# Patient Record
Sex: FEMALE | Race: BLACK OR AFRICAN AMERICAN | NOT HISPANIC OR LATINO | Employment: OTHER | URBAN - METROPOLITAN AREA
[De-identification: names, ages, dates, MRNs, and addresses within clinical notes are randomized per-mention and may not be internally consistent; named-entity substitution may affect disease eponyms.]

---

## 2023-05-05 LAB — URINE CULTURE: NO GROWTH

## 2023-05-20 LAB — URINE CULTURE: NORMAL

## 2023-06-20 LAB
BACTERIA, URINE: ABNORMAL /HPF
CLUE CELLS: ABNORMAL
MUCUS, URINE: ABNORMAL /LPF
NUGENT SCORE: 4
RBC, URINE: 11 /HPF (ref 0–5)
SQUAMOUS EPITHELIAL CELLS, URINE: 3 /HPF
VAGINITIS-BV + YEAST INTERPRETATION: ABNORMAL
WBC, URINE: 112 /HPF (ref 0–5)
YEAST: ABNORMAL

## 2023-06-21 LAB — URINE CULTURE: NORMAL

## 2023-06-24 LAB — FUNGAL SCREEN, YEAST: NORMAL

## 2023-10-05 DIAGNOSIS — R39.9 URINARY SYMPTOM OR SIGN: ICD-10-CM

## 2023-10-06 ENCOUNTER — LAB (OUTPATIENT)
Dept: LAB | Facility: LAB | Age: 73
End: 2023-10-06
Payer: COMMERCIAL

## 2023-10-06 DIAGNOSIS — R39.9 URINARY SYMPTOM OR SIGN: ICD-10-CM

## 2023-10-06 PROCEDURE — 87086 URINE CULTURE/COLONY COUNT: CPT

## 2023-10-08 LAB — BACTERIA UR CULT: NO GROWTH

## 2023-10-16 DIAGNOSIS — N94.9 VAGINAL SYMPTOM: ICD-10-CM

## 2023-10-16 RX ORDER — FLUCONAZOLE 150 MG/1
150 TABLET ORAL ONCE
Qty: 1 TABLET | Refills: 0 | Status: SHIPPED | OUTPATIENT
Start: 2023-10-16 | End: 2023-10-16

## 2023-10-16 RX ORDER — FLUTICASONE PROPIONATE 50 MCG
SPRAY, SUSPENSION (ML) NASAL
COMMUNITY
Start: 2022-04-08

## 2023-10-16 RX ORDER — ALBUTEROL SULFATE 90 UG/1
AEROSOL, METERED RESPIRATORY (INHALATION)
COMMUNITY
Start: 2013-11-12

## 2023-10-16 RX ORDER — FLAVOXATE HYDROCHLORIDE 100 MG/1
100 TABLET ORAL
COMMUNITY
Start: 2022-10-28

## 2023-10-16 RX ORDER — SOLIFENACIN SUCCINATE 5 MG/1
5 TABLET, FILM COATED ORAL
COMMUNITY
Start: 2023-07-14 | End: 2024-01-12 | Stop reason: DRUGHIGH

## 2023-10-16 RX ORDER — HYDROXYZINE HYDROCHLORIDE 25 MG/1
25 TABLET, FILM COATED ORAL
COMMUNITY
Start: 2022-10-28

## 2023-10-16 RX ORDER — TOPIRAMATE 50 MG/1
4 TABLET, FILM COATED ORAL NIGHTLY PRN
COMMUNITY
Start: 2023-09-22

## 2023-10-16 RX ORDER — LISINOPRIL 2.5 MG/1
2.5 TABLET ORAL
COMMUNITY
Start: 2023-07-21 | End: 2024-07-20

## 2023-10-16 RX ORDER — AMITRIPTYLINE HYDROCHLORIDE 25 MG/1
25 TABLET, FILM COATED ORAL NIGHTLY
COMMUNITY
Start: 2022-10-28

## 2023-10-18 PROBLEM — Z86.010 HX OF COLONIC POLYPS: Status: ACTIVE | Noted: 2017-11-01

## 2023-10-18 PROBLEM — E66.3 OVERWEIGHT WITH BODY MASS INDEX (BMI) 25.0-29.9: Status: ACTIVE | Noted: 2022-05-27

## 2023-10-18 PROBLEM — R30.0 DYSURIA: Status: ACTIVE | Noted: 2021-12-23

## 2023-10-18 PROBLEM — K21.9 GASTROESOPHAGEAL REFLUX DISEASE WITHOUT ESOPHAGITIS: Status: ACTIVE | Noted: 2021-06-17

## 2023-10-18 PROBLEM — Z86.0100 HX OF COLONIC POLYPS: Status: ACTIVE | Noted: 2017-11-01

## 2023-10-18 PROBLEM — I10 ESSENTIAL HYPERTENSION: Status: ACTIVE | Noted: 2022-05-27

## 2023-10-18 PROBLEM — N30.10 INTERSTITIAL CYSTITIS: Status: ACTIVE | Noted: 2022-11-22

## 2023-10-18 PROBLEM — R19.8 RECTAL PRESSURE: Status: ACTIVE | Noted: 2022-06-06

## 2023-10-18 RX ORDER — POLYETHYLENE GLYCOL 3350 17 G/17G
POWDER, FOR SOLUTION ORAL
COMMUNITY

## 2023-10-18 RX ORDER — DIAZEPAM 2 MG/1
2 TABLET ORAL
COMMUNITY

## 2023-10-18 RX ORDER — POLYETHYLENE GLYCOL 3350, SODIUM CHLORIDE, SODIUM BICARBONATE, POTASSIUM CHLORIDE 420; 11.2; 5.72; 1.48 G/4L; G/4L; G/4L; G/4L
POWDER, FOR SOLUTION ORAL
COMMUNITY

## 2023-10-18 RX ORDER — OMEPRAZOLE 40 MG/1
CAPSULE, DELAYED RELEASE ORAL
COMMUNITY
Start: 2022-06-28

## 2023-10-19 ENCOUNTER — TREATMENT (OUTPATIENT)
Dept: PHYSICAL THERAPY | Facility: CLINIC | Age: 73
End: 2023-10-19
Payer: COMMERCIAL

## 2023-10-19 DIAGNOSIS — R10.2 PELVIC PAIN IN FEMALE: Primary | ICD-10-CM

## 2023-10-19 DIAGNOSIS — R35.0 URINARY FREQUENCY: ICD-10-CM

## 2023-10-19 PROCEDURE — 97110 THERAPEUTIC EXERCISES: CPT | Mod: GP

## 2023-10-19 PROCEDURE — 97535 SELF CARE MNGMENT TRAINING: CPT | Mod: GP

## 2023-10-19 NOTE — LETTER
October 20, 2023    Alejandrina Abdalla, DRAGAN  [address] [facility] [address]    Patient: Pavan Cuevas   YOB: 1950   Date of Visit: 10/19/2023       Dear No referring provider defined for this encounter.    The attached plan of care is being sent to you because your patient’s medical reimbursement requires that you certify the plan of care. Your signature is required to allow uninterrupted insurance coverage.      You may indicate your approval by signing below and faxing this form back to us at Dept Fax: [phone].    Please call Dept: [phone] with any questions or concerns.    Thank you for this referral,        Alejandrina Abdalla PT  [facility] [address]  [address] [facility][1] [id number]    Payer: Payor: ABBE / Plan: [facility] [facility] / Product Type: *No Product type* /                                                                         Date:     Dear Alejandrina Abdalla PT,     Re: Ms. Pavan Cuevas, MRN:[id number]    I certify that I have reviewed the attached plan of care and it is medically necessary for Ms. Pavan Cuevas (1950) who is under my care.          ______________________________________                    _________________  Provider name and credentials                                           Date and time                                                                                           Plan of Care 10/20/23   Effective from: 10/20/2023  Effective to: 1/11/2024    Plan ID: [id number]            Participants as of Finalize on 10/20/2023    Name Type Comments Contact Info    Alejandrina Abdalla PT Physical Therapist  [phone]    Eufemia Gold MD MPH Consulting Physician  [phone]       Last Plan Note     Author: Alejandrina Abdalla PT Status: Sign when Signing Visit Last edited: 10/19/2023  3:15 PM       Physical Therapy    PELVIC FLOOR Treatment & Re-evaluation    Name: Pavan Cuevas  MRN:  [id number]  : 1950  Today's Date: 10/19/23     Time Calculation  Start Time: 320  Stop Time: 420  Time Calculation (min): 60 min  Visit 8    Assessment:     Patient is a 72 yo with past medical history inclusive of interstitial cystitis who was referred to physical therapy for pelvic pain, urinary frequency. She completed 8 visits since initial evaluation and continues to make progress with PT POC. She is without pain today or over the past week. Despite improvements in pain, increased urinary frequency is still present, although improved during the day since initial evaluation (night voids consistent on average with initial evaluation report). Patient is aware that stress is a contributing factor to increased urinary frequency and is incorporating stress reduction techniques, bladder retraining, behavioral modifications, and lifestyle changes to assist with management of symptoms. Encouraged increased consistency with HEP to further modulate and improve symptoms. Patient would benefit from continued PT interventions to progress towards all unmet goals, bladder retraining, behavioral modifications, and lifestyle changes to improve the patients quality of life.    Plan:    Planned interventions include: biofeedback, education/instruction, home program, hot pack, manual therapy, self care/home management, therapeutic activities and therapeutic exercises.   Frequency and duration: 1 time(s) a week, for 4-6 additional visits.   Potential to achieve rehab goals is good    Plan of care was developed with input and agreement by the patient.     Current Problem:  1. Pelvic pain in female        2. Urinary frequency            Subjective   HEP every 4-5 days.  No pain today or over the past week. No longer with general ache in the lower abdomen.  Notices frequency trigger with stress (I.e. with taking care of grandbaby), but is aware, utilizes techniques and able to hold.   Just weaned self for two weeks from  Solifenacin. Only went four times last night, otherwise 10-11 times over the past week (between 2-4am). Not having the urgency like how it used to be.  Walking 4 days/week, 4 miles. No pain and hasn't had to race to the restroom.  Occasional difficulty during the night starting the flow of urine, discomfort initially, but goes way. Does notice straining/pushing.     Objective     BLADDER:   Excessive Urinary Urgency: reduced; reports increased frequency  Nighttime Voiding Frequency: 10-11 voids/night  Unintentional urine loss frequency: none  Difficulty initiating flow of urine: occasionally  Difficulty starting urine stream/push to urinate: at night time  Tests performed by doctor: (-) recent urinalysis due to c/o burning/itching sensation after voiding. Plans to take medication for potential yeast infection  Followed up with [facility] nutritionist and is starting to add in foods.    BOWEL:   Excessive Bowel Urgency: no  BM Frequency: 2 times/day  Frequent Diarrhea: no  Frequent constipation/straining/incomplete emptying: no    POSTURE: forward head, rounded shoulders; intermittent forward lean elbows onto knees; decreased lumbar lordosis      Gait: Indep ambulation, mild antalgic gait pattern, decreased R knee flexion/extension maintaining stiff posture (reports of chronic R knee pain)    MMT R/L:  Hip flexion: 4-/5 B  Hip abduction: 4-/5 B  Hip extension: 4-/5 B  Hip adduction: 4/5 B  Bridge endurance: 60 seconds  Knee flexion, extension 4-/5 R, 4/5 L    ROM R/L:  Hip Extension: R Active 10, L Active 10.   Hip Flexion: R Active 120, L Active 120.   Hip Abduction: R Active 25, L Active 30.   Hip External Rotation: R Active 30, L Active 30.   Hip Internal Rotation: R Active 30, L Active 20.   Flexibility:  mild decreased hip flexor, adductors, piriformis. No tightness B hamstrings.     OUTCOMES MEASURE:  Female NIH - Chronic Prostatitis Symptom Index (NIH-CPSI): Pain: 10; Urinary Symptoms: 5; QOL Impact: 9; total: 24  (score 38 on evaluation)    Treatment:  Therapeutic exercise x 30 minutes:  Reassess  Reviewed/Performed:  Postural awareness stacking head over shoulders, shoulders over hips in sitting/standing; cues for head down relaxation (start with relaxing jaw, neck, shoulders, abdomen, pelvic floor, etc.)    Functional standing focusing on equal weight shift; functional sit to stand maintaining control  Nicanor pose fwd, lateral ea - modified to sitting at chair; seated nicanor pose  Supine DKTC with PB  Supine figure four stretch, with added rotation ea  Adductor stretch  Supine happy baby, pelvic floor stretch  Prone press ups on elbows, prone prop with pillow  Forward stretch seated with PB  Sidelying open book stretch  Seated pelvic tilt  Supine hamstrings stretch with strap ea  Off mat stretch with strap ea.    Self Care/Home Management x 30 minutes: Bladder retraining day/night, additional 5 minute hold to assist with reduction in frequency, distraction techniques, down regulation of nervous system in stressful situations, diaphragmatic breathing, self relaxation/self talk/reminder of just voiding if onset of frequency arises (day/night); mindfulness relaxation unclenching. Bladder irritants with intimaterose article provided.    Goals:  Active       PT Problem       Improved utilization of proper diaphragmatic breathing and proper breathing patterns at rest and with effort and exertion for improved load management to decrease force and pressure on pelvic floor (Progressing)       Start:  10/19/23    Expected End:  01/11/24            Independent with consistent utilization of urge suppression strategies and bladder retraining strategies (Progressing)       Start:  10/19/23    Expected End:  01/11/24            Voiding frequency of at most <10x per day with void interval of 1x per 2-5 hours (Progressing)       Start:  10/19/23    Expected End:  01/11/24            50% reduction in rises to void per night to promote  proper sleep habits and proper bladder habits       Start:  10/19/23    Expected End:  01/11/24         Goal Note       One episode of reduced voids; 10-11 on average; continue to progress towards goal              [place] and compliance with HEP and self management for home maintenance (Progressing)       Start:  10/19/23    Expected End:  01/11/24            Pt will report a 6 point improvement on NIH-CPSI to demonstrate a reduction in symptoms and increase quality of life  (Met)       Start:  10/19/23    Expected End:  01/11/24    Resolved:  10/19/23      Goal Note       Updated goal 10/19/2023: Patient will report an additional 6 point improvement on NIH-CPSI to demonstrate a reduction in symptoms and increased quality of life                             Alejandrina Abdalla, PT         Current Participants as of 10/20/2023    Name Type Comments Contact Info    Alejandrina Abdalla, PT Physical Therapist  [phone]    Signature pending    Eufemia Gold MD MPH Consulting Physician  [phone]

## 2023-10-19 NOTE — PROGRESS NOTES
Physical Therapy    PELVIC FLOOR Treatment & Re-evaluation    Name: Pavan Cuevas  MRN: [id number]  : 1950  Today's Date: 10/19/23     Time Calculation  Start Time: 320  Stop Time: 420  Time Calculation (min): 60 min  Visit 8    Assessment:     Patient is a 72 yo with past medical history inclusive of interstitial cystitis who was referred to physical therapy for pelvic pain, urinary frequency. She completed 8 visits since initial evaluation and continues to make progress with PT POC. She is without pain today or over the past week. Despite improvements in pain, increased urinary frequency is still present, although improved during the day since initial evaluation (night voids consistent on average with initial evaluation report). Patient is aware that stress is a contributing factor to increased urinary frequency and is incorporating stress reduction techniques, bladder retraining, behavioral modifications, and lifestyle changes to assist with management of symptoms. Encouraged increased consistency with HEP to further modulate and improve symptoms. Patient would benefit from continued PT interventions to progress towards all unmet goals, bladder retraining, behavioral modifications, and lifestyle changes to improve the patients quality of life.    Plan:    Planned interventions include: biofeedback, education/instruction, home program, hot pack, manual therapy, self care/home management, therapeutic activities and therapeutic exercises.   Frequency and duration: 1 time(s) a week, for 4-6 additional visits.   Potential to achieve rehab goals is good    Plan of care was developed with input and agreement by the patient.     Current Problem:  1. Pelvic pain in female        2. Urinary frequency            Subjective   HEP every 4-5 days.  No pain today or over the past week. No longer with general ache in the lower abdomen.  Notices frequency trigger with stress (I.e. with taking care of grandbaby), but is  aware, utilizes techniques and able to hold.   Just weaned self for two weeks from Solifenacin. Only went four times last night, otherwise 10-11 times over the past week (between 2-4am). Not having the urgency like how it used to be.  Walking 4 days/week, 4 miles. No pain and hasn't had to race to the restroom.  Occasional difficulty during the night starting the flow of urine, discomfort initially, but goes way. Does notice straining/pushing.     Objective     BLADDER:   Excessive Urinary Urgency: reduced; reports increased frequency  Nighttime Voiding Frequency: 10-11 voids/night  Unintentional urine loss frequency: none  Difficulty initiating flow of urine: occasionally  Difficulty starting urine stream/push to urinate: at night time  Tests performed by doctor: (-) recent urinalysis due to c/o burning/itching sensation after voiding. Plans to take medication for potential yeast infection  Followed up with [facility] nutritionist and is starting to add in foods.    BOWEL:   Excessive Bowel Urgency: no  BM Frequency: 2 times/day  Frequent Diarrhea: no  Frequent constipation/straining/incomplete emptying: no    POSTURE: forward head, rounded shoulders; intermittent forward lean elbows onto knees; decreased lumbar lordosis      Gait: Indep ambulation, mild antalgic gait pattern, decreased R knee flexion/extension maintaining stiff posture (reports of chronic R knee pain)    MMT R/L:  Hip flexion: 4-/5 B  Hip abduction: 4-/5 B  Hip extension: 4-/5 B  Hip adduction: 4/5 B  Bridge endurance: 60 seconds  Knee flexion, extension 4-/5 R, 4/5 L    ROM R/L:  Hip Extension: R Active 10, L Active 10.   Hip Flexion: R Active 120, L Active 120.   Hip Abduction: R Active 25, L Active 30.   Hip External Rotation: R Active 30, L Active 30.   Hip Internal Rotation: R Active 30, L Active 20.   Flexibility:  mild decreased hip flexor, adductors, piriformis. No tightness B hamstrings.     OUTCOMES MEASURE:  Female NIH - Chronic Prostatitis  Symptom Index (NIH-CPSI): Pain: 10; Urinary Symptoms: 5; QOL Impact: 9; total: 24 (score 38 on evaluation)    Treatment:  Therapeutic exercise x 30 minutes:  Reassess  Reviewed/Performed:  Postural awareness stacking head over shoulders, shoulders over hips in sitting/standing; cues for head down relaxation (start with relaxing jaw, neck, shoulders, abdomen, pelvic floor, etc.)    Functional standing focusing on equal weight shift; functional sit to stand maintaining control  Nicanor pose fwd, lateral ea - modified to sitting at chair; seated nicanor pose  Supine DKTC with PB  Supine figure four stretch, with added rotation ea  Adductor stretch  Supine happy baby, pelvic floor stretch  Prone press ups on elbows, prone prop with pillow  Forward stretch seated with PB  Sidelying open book stretch  Seated pelvic tilt  Supine hamstrings stretch with strap ea  Off mat stretch with strap ea.    Self Care/Home Management x 30 minutes: Bladder retraining day/night, additional 5 minute hold to assist with reduction in frequency, distraction techniques, down regulation of nervous system in stressful situations, diaphragmatic breathing, self relaxation/self talk/reminder of just voiding if onset of frequency arises (day/night); mindfulness relaxation unclenching. Bladder irritants with intimaterose article provided.    Goals:  Active       PT Problem       Improved utilization of proper diaphragmatic breathing and proper breathing patterns at rest and with effort and exertion for improved load management to decrease force and pressure on pelvic floor (Progressing)       Start:  10/19/23    Expected End:  01/11/24            Independent with consistent utilization of urge suppression strategies and bladder retraining strategies (Progressing)       Start:  10/19/23    Expected End:  01/11/24            Voiding frequency of at most <10x per day with void interval of 1x per 2-5 hours (Progressing)       Start:  10/19/23    Expected  End:  01/11/24            50% reduction in rises to void per night to promote proper sleep habits and proper bladder habits       Start:  10/19/23    Expected End:  01/11/24         Goal Note       One episode of reduced voids; 10-11 on average; continue to progress towards goal              [place] and compliance with HEP and self management for home maintenance (Progressing)       Start:  10/19/23    Expected End:  01/11/24            Pt will report a 6 point improvement on NIH-CPSI to demonstrate a reduction in symptoms and increase quality of life  (Met)       Start:  10/19/23    Expected End:  01/11/24    Resolved:  10/19/23      Goal Note       Updated goal 10/19/2023: Patient will report an additional 6 point improvement on NIH-CPSI to demonstrate a reduction in symptoms and increased quality of life                             Alejandrina Abdalla, PT   No

## 2023-10-30 DIAGNOSIS — N89.8 VAGINAL DRYNESS: ICD-10-CM

## 2023-10-31 RX ORDER — ESTRADIOL 0.1 MG/G
CREAM VAGINAL
Qty: 42.5 G | Refills: 3 | Status: SHIPPED | OUTPATIENT
Start: 2023-10-31

## 2023-11-14 ENCOUNTER — APPOINTMENT (OUTPATIENT)
Dept: PHYSICAL THERAPY | Facility: CLINIC | Age: 73
End: 2023-11-14
Payer: COMMERCIAL

## 2023-11-28 ENCOUNTER — TREATMENT (OUTPATIENT)
Dept: PHYSICAL THERAPY | Facility: CLINIC | Age: 73
End: 2023-11-28
Payer: COMMERCIAL

## 2023-11-28 DIAGNOSIS — R10.2 PELVIC PAIN IN FEMALE: Primary | ICD-10-CM

## 2023-11-28 DIAGNOSIS — R35.0 URINARY FREQUENCY: ICD-10-CM

## 2023-11-28 PROCEDURE — 97110 THERAPEUTIC EXERCISES: CPT | Mod: GP

## 2023-11-28 PROCEDURE — 97140 MANUAL THERAPY 1/> REGIONS: CPT | Mod: GP

## 2023-11-28 NOTE — PROGRESS NOTES
Physical Therapy    PELVIC FLOOR Treatment & Discharge    Name: Pavan Cuevas  MRN: [id number]  : 1950  Today's Date: 23     Time Calculation  Start Time: 335  Stop Time: 435  Time Calculation (min): 60 min  Visit 9    Assessment:    Patient is a 74 yo with past medical history inclusive of interstitial cystitis who was referred to physical therapy for pelvic pain, urinary frequency. She reports significant reduction of voids during the day and at night, and continues to complete bladder retraining to further reduce voids and assist with management of symptoms. At times, voids can be up to 10 times per day (20+ at initial evaluation), but when patient utilizes consistent strategies, voids reduce to 8 times per day. She feels Independent with management of her symptoms at this time and is comfortable continuing with this Independently. Patient is aware that stress is a contributing factor to her increased urinary frequency and is incorporating stress reduction techniques, continued bladder retraining, behavioral modifications, and lifestyle changes to assist with management of symptoms. Encouraged continued consistency with HEP to further modulate and improve symptoms. Patient also reports significant self-reported improvement as seen on the NIH-CPSI, with score of 15 (38 on initial evaluation, 24 at re-assess). Overall, patient has made significant progress with PT POC. Recommend continuation of HEP, and patient is aware to return to referring provider should symptoms worsen/unable to manage.    Plan:    Discharge from PT POC.     Current Problem:  1. Pelvic pain in female        2. Urinary frequency              Subjective   2023: Better. Using breathing with stress. HEP 4 days/week and completes total body exercising, walking (3-4 days/week).    10/19/2023:  No pain today or over the past week. No longer with general ache in the lower abdomen.  Notices frequency trigger with stress (I.e. with  taking care of grandbaby), but is aware, utilizes techniques and able to hold.   No pain and hasn't had to race to the restroom with walking.    Objective     BLADDER:   Excessive Urinary Urgency: reduced. Voids approximately 8x's/day, sometimes 10 voids/day (20+ at evaluation).  Nighttime Voiding Frequency: 5-6 voids/night (12 at evaluation)  Unintentional urine loss frequency: none  Difficulty initiating flow of urine: occasionally at night time, not straining    BOWEL:   Excessive Bowel Urgency: no  BM Frequency: 2 times/day  Frequent Diarrhea: no  Frequent constipation/straining/incomplete emptying: no    POSTURE: mild forward head, rounded shoulders; intermittent forward lean elbows onto knees; decreased lumbar lordosis  - self-aware and able to self-correct     Gait: Indep ambulation, mild antalgic gait due to chronic R knee pain, demonstrating decreased knee flexion    MMT R/L:  Hip flexion: 4+/5 B  Hip abduction: 4+/5 B  Hip extension: 4/5 B  Hip adduction: 4+/5 B  Bridge endurance: 60 seconds    ROM R/L:  Hip Extension: R Active 10, L Active 10.   Hip Flexion: R Active 120, L Active 120.   Hip Abduction: R Active 25, L Active 30.   Hip External Rotation: R Active 30, L Active 30.   Hip Internal Rotation: R Active 30, L Active 20.   Flexibility:  mild decreased hip flexor, adductors, piriformis. No tightness B hamstrings.     OUTCOMES MEASURE:  Female NIH - Chronic Prostatitis Symptom Index (NIH-CPSI): Pain: 7; Urinary Symptoms: 4; QOL Impact: 4; total: 15 (score 38 on evaluation)    Treatment:  Therapeutic exercise x 30 minutes:  Reassess  Reviewed/Performed:  Postural awareness stacking head over shoulders, shoulders over hips in sitting/standing; cues for head down relaxation (start with relaxing jaw, neck, shoulders, abdomen, pelvic floor, etc.)    Functional standing focusing on equal weight shift; functional sit to stand maintaining control  Nicanor pose fwd, lateral ea - modified to sitting at chair;  seated ana maria pose  Supine DKTC with PB  Supine figure four stretch, with added rotation ea  Adductor stretch  Supine happy baby, pelvic floor stretch  Prone press ups on elbows, prone prop with pillow  Forward stretch seated with PB  Sidelying open book stretch  Seated pelvic tilt  Supine hamstrings stretch with strap ea  Off mat stretch with strap ea  Self Care/Home Management x 30 minutes: Bladder retraining day/night, additional 5 minute hold to assist with reduction in frequency, distraction techniques, down regulation of nervous system in stressful situations, diaphragmatic breathing, self relaxation/self talk/reminder of just voiding if onset of frequency arises (day/night); mindfulness relaxation unclenching, bladder irritants, benefits of consistent sleep.    Goals:  Resolved       PT Problem       Improved utilization of proper diaphragmatic breathing and proper breathing patterns at rest and with effort and exertion for improved load management to decrease force and pressure on pelvic floor (Met)       Start:  10/19/23    Expected End:  01/11/24    Resolved:  11/29/23         Independent with consistent utilization of urge suppression strategies and bladder retraining strategies (Met)       Start:  10/19/23    Expected End:  01/11/24    Resolved:  11/29/23         Voiding frequency of at most <10x per day with void interval of 1x per 2-5 hours (Adequate for Discharge)       Start:  10/19/23    Expected End:  01/11/24        Voids approximately 8x's/day, sometimes 10         50% reduction in rises to void per night to promote proper sleep habits and proper bladder habits (Met)       Start:  10/19/23    Expected End:  01/11/24    Resolved:  11/29/23         [place] and compliance with HEP and self management for home maintenance (Met)       Start:  10/19/23    Expected End:  01/11/24    Resolved:  11/29/23         Pt will report a 6 point improvement on NIH-CPSI to demonstrate a reduction in symptoms  and increase quality of life  (Met)       Start:  10/19/23    Expected End:  01/11/24    Resolved:  10/19/23      Goal Note       Updated goal 10/19/2023: Patient will report an additional 6 point improvement on NIH-CPSI to demonstrate a reduction in symptoms and increased quality of life                               Alejandrina Abdalla, PT

## 2023-11-29 ENCOUNTER — DOCUMENTATION (OUTPATIENT)
Dept: PHYSICAL THERAPY | Facility: CLINIC | Age: 73
End: 2023-11-29
Payer: COMMERCIAL

## 2023-11-29 NOTE — PROGRESS NOTES
Physical Therapy    Discharge Summary    Name: Pavan Cuevas  MRN: [id number]  : 1950  Date: 2023    Discharge Summary: PT    Discharge Information: Date of discharge 2023 and Number of attended visits 9    Therapy Summary:  Patient is a 74 yo with past medical history inclusive of interstitial cystitis who was referred to physical therapy for pelvic pain, urinary frequency. She reports significant reduction of voids during the day and at night, and continues to complete bladder retraining to further reduce voids and assist with management of symptoms. At times, voids can be up to 10 times per day (20+ at initial evaluation), but when patient utilizes consistent strategies, voids reduce to 8 times per day. She feels Independent with management of her symptoms at this time and is comfortable continuing with this Independently. Patient is aware that stress is a contributing factor to her increased urinary frequency and is incorporating stress reduction techniques, continued bladder retraining, behavioral modifications, and lifestyle changes to assist with management of symptoms. Encouraged continued consistency with HEP to further modulate and improve symptoms. Patient also reports significant self-reported improvement as seen on the NIH-CPSI, with score of 15 (38 on initial evaluation, 24 at re-assess). Overall, patient has made significant progress with PT POC. Recommend continuation of HEP, and patient is aware to return to referring provider should symptoms worsen/unable to manage.     Rehab Discharge Reason: Achieved all and/or the most significant goals(s). Encouraged continued consistency with HEP Independently to further modulate and improve symptoms, as patient demonstrates improvement in void frequency reduction with consistent use of bladder retraining, stress reduction techniques, and behavioral modifications.

## 2023-12-12 ENCOUNTER — APPOINTMENT (OUTPATIENT)
Dept: PHYSICAL THERAPY | Facility: CLINIC | Age: 73
End: 2023-12-12
Payer: COMMERCIAL

## 2023-12-28 ENCOUNTER — OFFICE VISIT (OUTPATIENT)
Dept: UROLOGY | Facility: CLINIC | Age: 73
End: 2023-12-28
Payer: COMMERCIAL

## 2023-12-28 DIAGNOSIS — N30.10 INTERSTITIAL CYSTITIS: Primary | ICD-10-CM

## 2023-12-28 PROCEDURE — 99214 OFFICE O/P EST MOD 30 MIN: CPT | Performed by: OBSTETRICS & GYNECOLOGY

## 2023-12-28 NOTE — PROGRESS NOTES
HISTORY OF PRESENT ILLNESS:   Pavan Cuevas is a 73 y.o. female who has been using vesicare 5 mg. Says it helps during the day but not at night.   Overall happy with it.    Wondering if stress can cause flares.    Doing well with vaginal estrogen. Occasionally has urethral burning. Possibly has missed a dose of estrogen when this happens.    PAST MEDICAL HISTORY:  No past medical history on file.    PAST SURGICAL HISTORY:  No past surgical history on file.     ALLERGIES:   Allergies   Allergen Reactions    Nsaids (Non-Steroidal Anti-Inflammatory Drug) Unknown     Heart Condition    Specified to avoid use of NSAIDs beyond 1 week by Cardiology    Omeprazole Unknown     Rash        MEDICATIONS:   Current Outpatient Medications   Medication Sig Dispense Refill    albuterol (ProAir HFA) 90 mcg/actuation inhaler       amitriptyline (Elavil) 25 mg tablet 1 tablet (25 mg) once daily at bedtime.      diazePAM (Valium) 2 mg tablet Take 1 tablet (2 mg) by mouth.      docusate sodium (COLACE ORAL) Take 1 capsule by mouth 2 times a day.      estradiol (Estrace) 0.01 % (0.1 mg/gram) vaginal cream Insert a pea-sized amount into vagina three times per week at bedtime 42.5 g 3    flavoxATE (Urispas) 100 mg tablet Take 1 tablet (100 mg) by mouth.      fluticasone (Flonase) 50 mcg/actuation nasal spray Administer into affected nostril(s).      hydrOXYzine HCL (Atarax) 25 mg tablet Take 1 tablet (25 mg) by mouth.      lisinopril 2.5 mg tablet Take 1 tablet (2.5 mg) by mouth once daily.      omeprazole (PriLOSEC) 40 mg DR capsule Take by mouth.      polyethylene glycol (ClearLax) 17 gram packet Mix 17 grams in 8 ounces of liquid and drink twice daily as needed      polyethylene glycol-electrolytes (Nulytely) 420 gram solution       SITagliptin phosphate (Januvia) 100 mg tablet Take 1 tablet (100 mg) by mouth once daily.      solifenacin (VESIcare) 5 mg tablet Take 1 tablet (5 mg) by mouth once daily.      topiramate (Topamax) 50  mg tablet Take 4 mg by mouth as needed at bedtime.       No current facility-administered medications for this visit.          SOCIAL HISTORY:  Patient     Social History     Socioeconomic History    Marital status: Single     Spouse name: Not on file    Number of children: Not on file    Years of education: Not on file    Highest education level: Not on file   Occupational History    Not on file   Tobacco Use    Smoking status: Not on file    Smokeless tobacco: Not on file   Substance and Sexual Activity    Alcohol use: Not on file    Drug use: Not on file    Sexual activity: Not on file   Other Topics Concern    Not on file   Social History Narrative    Not on file     Social Determinants of Health     Financial Resource Strain: Not on file   Food Insecurity: Not on file   Transportation Needs: Not on file   Physical Activity: Not on file   Stress: Not on file   Social Connections: Not on file   Intimate Partner Violence: Not on file   Housing Stability: Not on file       FAMILY HISTORY:  No family history on file.    REVIEW OF SYSTEMS:  Constitutional: Negative for fever and chills. Denies anorexia, weight loss.  Eyes: Negative for visual disturbance.   Respiratory: Negative for shortness of breath.    Cardiovascular: Negative for chest pain.   Gastrointestinal: Negative for nausea and vomiting.   Genitourinary: See interval history above.  Skin: Negative for rash.   Neurological: Negative for dizziness and numbness.   Psychiatric/Behavioral: Negative for confusion and decreased concentration.     PHYSICAL EXAM:  There were no vitals taken for this visit.  Constitutional: Patient appears well-developed and well-nourished. No distress.    Head: Normocephalic and atraumatic.    Neck: Normal range of motion.    Cardiovascular: Normal rate.    Pulmonary/Chest: Effort normal. No respiratory distress.   Musculoskeletal: Normal range of motion.    Neurological: Alert and oriented to person, place, and  time.  Psychiatric: Normal mood and affect. Behavior is normal. Thought content normal.        Assessment:      1. Interstitial cystitis        2. OAB    Pavan Cuevas is a 73 y.o. female with IC and OAB doing better on vesicare and estrogen     Plan:    Will do trial of 10 mg vesicare to see if this helps with nocturia  Follow up with Lisandra in April      Eufemia Gold MD [facility]

## 2024-01-12 DIAGNOSIS — N30.10 INTERSTITIAL CYSTITIS: ICD-10-CM

## 2024-01-12 RX ORDER — SOLIFENACIN SUCCINATE 10 MG/1
10 TABLET, FILM COATED ORAL DAILY
Qty: 30 TABLET | Refills: 8 | Status: SHIPPED
Start: 2024-01-12 | End: 2024-04-05 | Stop reason: SINTOL

## 2024-04-01 ENCOUNTER — TELEPHONE (OUTPATIENT)
Dept: UROLOGY | Facility: CLINIC | Age: 74
End: 2024-04-01
Payer: COMMERCIAL

## 2024-04-01 ENCOUNTER — LAB (OUTPATIENT)
Dept: LAB | Facility: LAB | Age: 74
End: 2024-04-01
Payer: COMMERCIAL

## 2024-04-01 DIAGNOSIS — N30.00 ACUTE CYSTITIS WITHOUT HEMATURIA: Primary | ICD-10-CM

## 2024-04-01 DIAGNOSIS — R35.0 FREQUENCY OF MICTURITION: Primary | ICD-10-CM

## 2024-04-01 DIAGNOSIS — R35.0 FREQUENCY OF MICTURITION: ICD-10-CM

## 2024-04-01 DIAGNOSIS — R39.9 SYMPTOMS OF URINARY TRACT INFECTION: ICD-10-CM

## 2024-04-01 PROCEDURE — 87186 SC STD MICRODIL/AGAR DIL: CPT

## 2024-04-01 PROCEDURE — 87086 URINE CULTURE/COLONY COUNT: CPT

## 2024-04-01 PROCEDURE — 81001 URINALYSIS AUTO W/SCOPE: CPT

## 2024-04-01 RX ORDER — NITROFURANTOIN 25; 75 MG/1; MG/1
100 CAPSULE ORAL 2 TIMES DAILY
Qty: 14 CAPSULE | Refills: 0 | Status: SHIPPED | OUTPATIENT
Start: 2024-04-01 | End: 2024-04-08

## 2024-04-01 NOTE — TELEPHONE ENCOUNTER
Patient left message on voicemail that she feels she has an uti. SX: urethral pain, ureter pain(both sides), bubbles in urine and constipation. She has an appointment on 4/5/2024 with you. She wants to know if she could be seen sooner or if there is something she could do prior to seeing you?  Please Advise?

## 2024-04-02 LAB
APPEARANCE UR: ABNORMAL
BILIRUB UR STRIP.AUTO-MCNC: NEGATIVE MG/DL
COLOR UR: YELLOW
GLUCOSE UR STRIP.AUTO-MCNC: NORMAL MG/DL
KETONES UR STRIP.AUTO-MCNC: ABNORMAL MG/DL
LEUKOCYTE ESTERASE UR QL STRIP.AUTO: ABNORMAL
NITRITE UR QL STRIP.AUTO: ABNORMAL
PH UR STRIP.AUTO: 5.5 [PH]
PROT UR STRIP.AUTO-MCNC: ABNORMAL MG/DL
RBC # UR STRIP.AUTO: ABNORMAL /UL
RBC #/AREA URNS AUTO: >20 /HPF
SP GR UR STRIP.AUTO: 1.02
SQUAMOUS #/AREA URNS AUTO: ABNORMAL /HPF
UROBILINOGEN UR STRIP.AUTO-MCNC: NORMAL MG/DL
WBC #/AREA URNS AUTO: >50 /HPF
WBC CLUMPS #/AREA URNS AUTO: ABNORMAL /HPF

## 2024-04-05 ENCOUNTER — OFFICE VISIT (OUTPATIENT)
Dept: UROLOGY | Facility: CLINIC | Age: 74
End: 2024-04-05
Payer: COMMERCIAL

## 2024-04-05 VITALS
SYSTOLIC BLOOD PRESSURE: 127 MMHG | HEART RATE: 82 BPM | WEIGHT: 157 LBS | DIASTOLIC BLOOD PRESSURE: 80 MMHG | TEMPERATURE: 96.5 F | HEIGHT: 66 IN | BODY MASS INDEX: 25.23 KG/M2

## 2024-04-05 DIAGNOSIS — R35.0 FREQUENCY OF MICTURITION: ICD-10-CM

## 2024-04-05 DIAGNOSIS — N95.8 GENITOURINARY SYNDROME OF MENOPAUSE: ICD-10-CM

## 2024-04-05 DIAGNOSIS — R30.0 DYSURIA: Primary | ICD-10-CM

## 2024-04-05 DIAGNOSIS — N30.00 ACUTE CYSTITIS WITHOUT HEMATURIA: ICD-10-CM

## 2024-04-05 LAB
BACTERIA UR CULT: ABNORMAL
POC APPEARANCE, URINE: CLEAR
POC BILIRUBIN, URINE: NEGATIVE
POC BLOOD, URINE: ABNORMAL
POC COLOR, URINE: YELLOW
POC GLUCOSE, URINE: NEGATIVE MG/DL
POC KETONES, URINE: NEGATIVE MG/DL
POC LEUKOCYTES, URINE: ABNORMAL
POC NITRITE,URINE: NEGATIVE
POC PH, URINE: 6 PH
POC PROTEIN, URINE: NEGATIVE MG/DL
POC SPECIFIC GRAVITY, URINE: 1.01
POC UROBILINOGEN, URINE: 0.2 EU/DL

## 2024-04-05 PROCEDURE — 3079F DIAST BP 80-89 MM HG: CPT | Performed by: NURSE PRACTITIONER

## 2024-04-05 PROCEDURE — 3074F SYST BP LT 130 MM HG: CPT | Performed by: NURSE PRACTITIONER

## 2024-04-05 PROCEDURE — 1125F AMNT PAIN NOTED PAIN PRSNT: CPT | Performed by: NURSE PRACTITIONER

## 2024-04-05 PROCEDURE — 99213 OFFICE O/P EST LOW 20 MIN: CPT | Performed by: NURSE PRACTITIONER

## 2024-04-05 PROCEDURE — 81002 URINALYSIS NONAUTO W/O SCOPE: CPT | Performed by: NURSE PRACTITIONER

## 2024-04-05 PROCEDURE — 1036F TOBACCO NON-USER: CPT | Performed by: NURSE PRACTITIONER

## 2024-04-05 PROCEDURE — 1159F MED LIST DOCD IN RCRD: CPT | Performed by: NURSE PRACTITIONER

## 2024-04-05 RX ORDER — SULFAMETHOXAZOLE AND TRIMETHOPRIM 800; 160 MG/1; MG/1
TABLET ORAL
Qty: 14 TABLET | Refills: 0 | Status: SHIPPED | OUTPATIENT
Start: 2024-04-05

## 2024-04-05 RX ORDER — TROSPIUM CHLORIDE 20 MG/1
20 TABLET, FILM COATED ORAL 2 TIMES DAILY
Qty: 60 TABLET | Refills: 11 | Status: SHIPPED | OUTPATIENT
Start: 2024-04-05 | End: 2025-04-05

## 2024-04-05 ASSESSMENT — PAIN SCALES - GENERAL: PAINLEVEL: 8

## 2024-04-05 NOTE — PATIENT INSTRUCTIONS
Estrogen cream pea size amount nightly with finger apply to urethra nightly x 2 weeks, then 3 x per week  Get rid of applicator, if this doesn't help   Can send Rx testosterone and estrogen compounding pharmacy, [facility] pharmacy [facility] [place]  Dmanose 2000 mg daily (1000 mg twice daily)  Trospium in evening (has at home)  Stop macrobid. Rx bactrim sent in    Follow up 4-6 weeks Lisandra Friday  morning, may double if need to get her in

## 2024-04-05 NOTE — PROGRESS NOTES
"04/05/24   [id number]    Chief Complaint   Patient presents with    Follow-up      Subjective      HPI Pavan Cuevas is a 73 y.o. female who presents for follow up overactive bladder; frequency and urgency at night, didn't tolerate higher dose to 10 mg dry mouth and constipation; has trospium at home would help w frequency at night;     Treated for Ecoli UTI on 4/1/24 sensitive to Macrobid; but doesn't feel abx working, small leuk today, trace heme;     Continues to have dysuria, using estrogen cream prescribed by Dr. Gold up inside vagina w dispenser, discussed applying w finger and rubbing into tissue at urethra, discussed if no improvement could use compounded testosterone/estrogen cream;       Objective     /80   Pulse 82   Temp 35.8 °C (96.5 °F)   Ht 1.676 m (5' 6\")   Wt 71.2 kg (157 lb)   BMI 25.34 kg/m²    Physical Exam  General: Appears comfortable and in no apparent distress, well nourished  Head: Normocephalic, atraumatic  Neck: trachea midline  Respiratory: respirations unlabored, no wheezes, and no use of accessory muscles  Cardiovascular: at rest no dyspnea, well perfused  Skin: no visible rashes or lesions  Neurologic: grossly intact, oriented to person, place, and time  Psychiatric: mood and affect appropriate  Musculoskeletal: in chair for appt. no difficulty w upper body movement      Assessment/Plan   Problem List Items Addressed This Visit          Genitourinary and Reproductive    Dysuria - Primary    Relevant Orders    POCT UA (nonautomated) manually resulted (Completed)     Other Visit Diagnoses       Acute cystitis without hematuria        Relevant Medications    sulfamethoxazole-trimethoprim (Bactrim DS) 800-160 mg tablet    Frequency of micturition        Relevant Medications    trospium (Sanctura) 20 mg tablet    Genitourinary syndrome of menopause              Orders Placed This Encounter   Procedures    POCT UA (nonautomated) manually resulted     Order Specific Question:   " "[facility]     Answer:   Immediate [1]      Estrogen cream pea size amount nightly with finger apply to urethra nightly x 2 weeks, then 3 x per week  Get rid of applicator, if this doesn't help   [facility] send Rx testosterone [facility] estrogen [facility]  Dmanose 2000 mg daily (1000 mg twice daily)  Trospium in evening (has at home)  Stop macrobid. Rx bactrim sent in    Follow up 4-6 weeks Lisandra Friday  morning, may double if need to get her in       Lisandra Vaughn, APRN-CNP  No results found for: \"GLUCOSE\", \"CALCIUM\", \"NA\", \"K\", \"CO2\", \"CL\", \"BUN\", \"CREATININE\"    "

## 2024-05-17 ENCOUNTER — LAB (OUTPATIENT)
Dept: LAB | Facility: LAB | Age: 74
End: 2024-05-17
Payer: COMMERCIAL

## 2024-05-17 ENCOUNTER — APPOINTMENT (OUTPATIENT)
Dept: UROLOGY | Facility: CLINIC | Age: 74
End: 2024-05-17
Payer: COMMERCIAL

## 2024-05-17 DIAGNOSIS — R39.9 URINARY SYMPTOM OR SIGN: ICD-10-CM

## 2024-05-17 PROCEDURE — 87086 URINE CULTURE/COLONY COUNT: CPT

## 2024-05-19 LAB — BACTERIA UR CULT: NORMAL

## 2024-06-14 ENCOUNTER — APPOINTMENT (OUTPATIENT)
Dept: UROLOGY | Facility: CLINIC | Age: 74
End: 2024-06-14
Payer: COMMERCIAL

## 2024-06-14 VITALS
BODY MASS INDEX: 23.54 KG/M2 | HEART RATE: 84 BPM | TEMPERATURE: 97.3 F | SYSTOLIC BLOOD PRESSURE: 142 MMHG | WEIGHT: 150 LBS | DIASTOLIC BLOOD PRESSURE: 79 MMHG | HEIGHT: 67 IN

## 2024-06-14 DIAGNOSIS — R19.8 PAIN ASSOCIATED WITH DEFECATION: ICD-10-CM

## 2024-06-14 DIAGNOSIS — N76.0 ACUTE VAGINITIS: Primary | ICD-10-CM

## 2024-06-14 DIAGNOSIS — R35.0 URINARY FREQUENCY: ICD-10-CM

## 2024-06-14 DIAGNOSIS — N30.10 IC (INTERSTITIAL CYSTITIS): ICD-10-CM

## 2024-06-14 DIAGNOSIS — R39.89 BLADDER PAIN: ICD-10-CM

## 2024-06-14 DIAGNOSIS — R30.0 DYSURIA: ICD-10-CM

## 2024-06-14 LAB
APPEARANCE UR: ABNORMAL
BILIRUB UR STRIP.AUTO-MCNC: NEGATIVE MG/DL
COLOR UR: ABNORMAL
GLUCOSE UR STRIP.AUTO-MCNC: NORMAL MG/DL
KETONES UR STRIP.AUTO-MCNC: NEGATIVE MG/DL
LEUKOCYTE ESTERASE UR QL STRIP.AUTO: ABNORMAL
MUCOUS THREADS #/AREA URNS AUTO: ABNORMAL /LPF
NITRITE UR QL STRIP.AUTO: NEGATIVE
PH UR STRIP.AUTO: 6 [PH]
POC APPEARANCE, URINE: CLEAR
POC BILIRUBIN, URINE: NEGATIVE
POC BLOOD, URINE: ABNORMAL
POC COLOR, URINE: YELLOW
POC GLUCOSE, URINE: NEGATIVE MG/DL
POC KETONES, URINE: NEGATIVE MG/DL
POC LEUKOCYTES, URINE: ABNORMAL
POC NITRITE,URINE: NEGATIVE
POC PH, URINE: 6.5 PH
POC PROTEIN, URINE: ABNORMAL MG/DL
POC SPECIFIC GRAVITY, URINE: 1.02
POC UROBILINOGEN, URINE: 0.2 EU/DL
PROT UR STRIP.AUTO-MCNC: ABNORMAL MG/DL
RBC # UR STRIP.AUTO: ABNORMAL /UL
RBC #/AREA URNS AUTO: ABNORMAL /HPF
SP GR UR STRIP.AUTO: 1.03
SQUAMOUS #/AREA URNS AUTO: ABNORMAL /HPF
UROBILINOGEN UR STRIP.AUTO-MCNC: NORMAL MG/DL
WBC #/AREA URNS AUTO: >50 /HPF

## 2024-06-14 PROCEDURE — 1036F TOBACCO NON-USER: CPT | Performed by: NURSE PRACTITIONER

## 2024-06-14 PROCEDURE — 3078F DIAST BP <80 MM HG: CPT | Performed by: NURSE PRACTITIONER

## 2024-06-14 PROCEDURE — 81003 URINALYSIS AUTO W/O SCOPE: CPT | Performed by: NURSE PRACTITIONER

## 2024-06-14 PROCEDURE — 81001 URINALYSIS AUTO W/SCOPE: CPT

## 2024-06-14 PROCEDURE — 1159F MED LIST DOCD IN RCRD: CPT | Performed by: NURSE PRACTITIONER

## 2024-06-14 PROCEDURE — 3077F SYST BP >= 140 MM HG: CPT | Performed by: NURSE PRACTITIONER

## 2024-06-14 PROCEDURE — 87086 URINE CULTURE/COLONY COUNT: CPT

## 2024-06-14 PROCEDURE — G2211 COMPLEX E/M VISIT ADD ON: HCPCS | Performed by: NURSE PRACTITIONER

## 2024-06-14 PROCEDURE — 87205 SMEAR GRAM STAIN: CPT

## 2024-06-14 PROCEDURE — 99214 OFFICE O/P EST MOD 30 MIN: CPT | Performed by: NURSE PRACTITIONER

## 2024-06-14 RX ORDER — AMITRIPTYLINE HYDROCHLORIDE 25 MG/1
25 TABLET, FILM COATED ORAL NIGHTLY
Qty: 30 TABLET | Refills: 3 | Status: SHIPPED | OUTPATIENT
Start: 2024-06-14 | End: 2025-06-14

## 2024-06-14 NOTE — PROGRESS NOTES
"06/14/24   [id number]    Overactive bladder, pelvic exam.      Subjective      HPI Pavan Cuevas is a 73 y.o. female who presents for follow up overactive bladder, requesting pelvic exam to determine where pain occurring;  has seen several providers for this issue now at multiple offices ([facility], [place] ).    frequency and urgency at night; has done pelvic floor physical therapy twice;     Failed Gemtesa, Myrbetriq   Failed solifenacin and trospium    Bladder instillations painful in past per patient    UA trace heme, small leuk  PVR 2 ml    Burning improved w estrogen cream applied with finger  But now back w dysuria, feels like urethra swollen    Constipation is struggle and feels pain w defecation    Treated for Ecoli UTI on 4/1/24 sensitive to Macrobid; but doesn't feel abx working, small leuk today, trace heme;     Already had cystoscopy twice [place] and [place]      Objective     /79   Pulse 84   Temp 36.3 °C (97.3 °F)   Ht 1.689 m (5' 6.5\")   Wt 68 kg (150 lb)   BMI 23.85 kg/m²    Physical Exam  Genitourinary:     Comments: No vulvar lesions, positive vestibule Qtip tenderness, mod atrophy  Neg CST lying down, Neg levator ani tenderness or tightness, only pain at opening  No cystocele, no rectocele or uterine prolapse  +tender ovaries/uterus, difficult exam d/t  body habitus   No rectal exam done        General: Appears comfortable and in no apparent distress, well nourished  Head: Normocephalic, atraumatic  Neck: trachea midline  Respiratory: respirations unlabored, no wheezes, and no use of accessory muscles  Cardiovascular: at rest no dyspnea, well perfused  Skin: no visible rashes or lesions  Neurologic: grossly intact, oriented to person, place, and time  Psychiatric: mood and affect appropriate  Musculoskeletal: in chair for appt. no difficulty w upper body movement      Assessment/Plan   Problem List Items Addressed This Visit          Genitourinary and Reproductive    Dysuria    Relevant Orders    " Urine Culture    Urinalysis with Reflex Microscopic     Other Visit Diagnoses       Acute vaginitis    -  Primary    Relevant Orders    Vaginitis Gram Stain For Bacterial Vaginosis + Yeast    IC (interstitial cystitis)        Relevant Orders    POCT UA Automated manually resulted (Completed)    Post-Void Residual (Completed)    Urinary frequency        Relevant Orders    POCT UA Automated manually resulted (Completed)    Post-Void Residual (Completed)    Urine Culture    Urinalysis with Reflex Microscopic    Pain associated with defecation        Relevant Orders    Referral to Colorectal Surgery    Bladder pain        Relevant Medications    amitriptyline (Elavil) 25 mg tablet          Orders Placed This Encounter   Procedures    Post-Void Residual    Urine Culture     Standing Status:   Future     Number of Occurrences:   1     Standing Expiration Date:   6/14/2025     Order Specific Question:   Release result to MyChart     Answer:   Immediate [1]    Vaginitis Gram Stain For Bacterial Vaginosis + Yeast     Order Specific Question:   Release result to MyChart     Answer:   Immediate [1]    Urinalysis with Reflex Microscopic     Standing Status:   Future     Number of Occurrences:   1     Standing Expiration Date:   6/14/2025     Order Specific Question:   Release result to MyChart     Answer:   Immediate [1]    Referral to Colorectal Surgery     Standing Status:   Future     Standing Expiration Date:   6/14/2025     Referral Priority:   Routine     Referral Type:   Consultation     Referral Reason:   Specialty Services Required     Requested Specialty:   Colon and Rectal Surgery     Number of Visits Requested:   1    POCT UA Automated manually resulted     Order Specific Question:   Release result to MyChart     Answer:   Immediate [1]      1.MiraLAX daily helps, if it doesn't help does dulcolax  2. Pain in rectum w evacuating, colonoscopy last year, imaging; Referral to colorectal specialist today for pain w  "defecation  3. Urine culture sent  4. Bladder instillations painful in past  5. Compounded suppositories for pain sent [facility] pharmacy  6. Compounded estrogen/testosterone to vestibule  7. Follow up w Dr. Gold discuss trigger point injections or botox if she felt helpful  8. Resume amitriptyline 25 mg daily    Nurse line [phone]       Lisandra Vaughn, APRN-CNP  No results found for: \"GLUCOSE\", \"CALCIUM\", \"NA\", \"K\", \"CO2\", \"CL\", \"BUN\", \"CREATININE\"    "

## 2024-06-14 NOTE — PATIENT INSTRUCTIONS
MiraLAX daily helps, if it doesn't help does dulcolax  Pain in rectum w evacuating, colonoscopy last year, imaging  Referral to colorectal specialist today for pain w defecation  Urine culture sent  Bladder instillations painful in past  Compounded suppositories for pain sent [facility]  Compounded estrogen/testosterone to vestibule  Follow up w Dr. Gold discuss trigger point injections or botox if she felt helpful  Nurse line [phone]

## 2024-06-15 LAB
BACTERIAL VAGINOSIS VAG-IMP: NORMAL
CLUE CELLS VAG LPF-#/AREA: NORMAL /[LPF]
NUGENT SCORE: 4
YEAST VAG WET PREP-#/AREA: NORMAL

## 2024-06-16 LAB — BACTERIA UR CULT: NORMAL

## 2024-06-21 ENCOUNTER — APPOINTMENT (OUTPATIENT)
Dept: SURGERY | Facility: CLINIC | Age: 74
End: 2024-06-21
Payer: COMMERCIAL

## 2024-06-24 ENCOUNTER — TELEPHONE (OUTPATIENT)
Dept: UROLOGY | Facility: CLINIC | Age: 74
End: 2024-06-24
Payer: COMMERCIAL

## 2024-06-24 NOTE — TELEPHONE ENCOUNTER
Pt informed and order placed with [facility]. Pt aware they will be sending her the kit to send back, thanks.

## 2024-06-24 NOTE — TELEPHONE ENCOUNTER
----- Message from ALICE Walsh sent at 6/18/2024 12:55 PM EDT -----  I think she needs PCR testing as urine culture from lab not picking up what's going on. Please arrange.     Please arrange w whichever company you think works best for our needs, if she can do at home, if they can ship to her would be nice. ty  ----- Message -----  From: Steph Toussaint MA  Sent: 6/14/2024   9:40 AM EDT  To: ALICE Walsh

## 2024-06-24 NOTE — TELEPHONE ENCOUNTER
Patient called back stating she checked with Pharmacist and they have not received an Rx from Lisandra.  #[phone]

## 2024-06-24 NOTE — TELEPHONE ENCOUNTER
Patient left voicemail on Friday, 6/21/2024 stating that she still hasn't heard from [facility] about Rx. Want to know if she should call the [facility] and what the number is to contact??  #[phone]

## 2024-06-25 ENCOUNTER — TELEPHONE (OUTPATIENT)
Dept: UROLOGY | Facility: CLINIC | Age: 74
End: 2024-06-25
Payer: COMMERCIAL

## 2024-06-25 NOTE — TELEPHONE ENCOUNTER
Spoke to [facility] and gave verbal for both medications. It is the same that is on the order in the chart under media.

## 2024-06-25 NOTE — TELEPHONE ENCOUNTER
Pt left message stating she can not afford the prices at [facility] as the cream is approx $80 and the suppositories are $115 bringing her total to around $200 for one month. Pt states that she usually gets her estradiol cream from [email or web address] and it's only $15 there and she does have refills she can use. She is asking if she can just use that cream and if there are any other options for the suppositories. Please advise, thanks.

## 2024-06-26 NOTE — TELEPHONE ENCOUNTER
Tried to speak with pt but her grandchild was crying and it was very difficult to hear her. We did decide to try out [facility] to see if their prices are better or if they can use her insurance. Order being faxed to [facility] today. Pt will call back if she can not afford it there either.

## 2024-07-19 ENCOUNTER — OFFICE VISIT (OUTPATIENT)
Dept: SURGERY | Facility: CLINIC | Age: 74
End: 2024-07-19
Payer: COMMERCIAL

## 2024-07-19 VITALS
DIASTOLIC BLOOD PRESSURE: 83 MMHG | HEART RATE: 81 BPM | WEIGHT: 147.1 LBS | TEMPERATURE: 97.3 F | BODY MASS INDEX: 23.39 KG/M2 | SYSTOLIC BLOOD PRESSURE: 136 MMHG

## 2024-07-19 DIAGNOSIS — K59.09 CHRONIC CONSTIPATION: Primary | ICD-10-CM

## 2024-07-19 DIAGNOSIS — R19.8 PAIN ASSOCIATED WITH DEFECATION: ICD-10-CM

## 2024-07-19 PROCEDURE — 99203 OFFICE O/P NEW LOW 30 MIN: CPT | Performed by: NURSE PRACTITIONER

## 2024-07-19 PROCEDURE — 99213 OFFICE O/P EST LOW 20 MIN: CPT | Performed by: NURSE PRACTITIONER

## 2024-07-19 PROCEDURE — 3075F SYST BP GE 130 - 139MM HG: CPT | Performed by: NURSE PRACTITIONER

## 2024-07-19 PROCEDURE — 3079F DIAST BP 80-89 MM HG: CPT | Performed by: NURSE PRACTITIONER

## 2024-07-19 RX ORDER — ACETAMINOPHEN 500 MG
1000 TABLET ORAL EVERY OTHER DAY
COMMUNITY

## 2024-07-19 NOTE — PROGRESS NOTES
History Of Present Illness  Pavan Cuevas is a 73 y.o. female who is presenting with constipation.     Colonoscopy 2022 negative.    She has had constipation for a few years.  She will take 2 Colace BID with a Dulcolax every other day to have a Bm.   She is not taking any fiber supplements.  She will have a Bm every day to every other day and she will have low pelvic pain when she is passing the stools.  No c/o any accidents or leakage of stool  No hx of any perianal surgeries.    She had an U/s of the abdomen that was negative.      She is not taking any fiber supplements.      Past Medical History  She has no past medical history on file.    Surgical History  She has no past surgical history on file.     Social History  She reports that she has never smoked. She has been exposed to tobacco smoke. She has never used smokeless tobacco. No history on file for alcohol use and drug use.    Family History  No family history on file.     Allergies  Nsaids (non-steroidal anti-inflammatory drug) and Trospium    Review of Systems   All other systems reviewed and are negative.       Physical Exam  Constitutional:       Appearance: Normal appearance.   HENT:      Head: Normocephalic and atraumatic.   Pulmonary:      Effort: Pulmonary effort is normal.   Musculoskeletal:         General: Normal range of motion.   Skin:     General: Skin is warm and dry.   Neurological:      General: No focal deficit present.      Mental Status: She is alert and oriented to person, place, and time.   Psychiatric:         Mood and Affect: Mood normal.         Behavior: Behavior normal.          Last Recorded Vitals  /83   Pulse 81   Temp 36.3 °C (97.3 °F)   Wt 66.7 kg (147 lb 1.6 oz)      Assessment/Plan   Pavan has chronic constipation and will start taking 2-3 Colace BID, Miralax and Benefiber daily to keep her stools more soft and regular.  She will stop taking the Dulcolax laxative for right now.  She will continue to increase her  fiber and water intake as well.  She will call with any issues and will follow up in 6-8 weeks if not better.         Larisa Zurita, APRN-CNP

## 2024-07-22 PROBLEM — K59.09 CHRONIC CONSTIPATION: Status: ACTIVE | Noted: 2024-07-22

## 2024-08-09 ENCOUNTER — APPOINTMENT (OUTPATIENT)
Dept: SURGERY | Facility: CLINIC | Age: 74
End: 2024-08-09
Payer: COMMERCIAL

## 2024-08-09 ENCOUNTER — TELEPHONE (OUTPATIENT)
Dept: UROLOGY | Facility: CLINIC | Age: 74
End: 2024-08-09
Payer: COMMERCIAL

## 2024-08-09 DIAGNOSIS — N30.00 ACUTE CYSTITIS WITHOUT HEMATURIA: Primary | ICD-10-CM

## 2024-08-09 RX ORDER — NITROFURANTOIN 25; 75 MG/1; MG/1
100 CAPSULE ORAL 2 TIMES DAILY
Qty: 14 CAPSULE | Refills: 0 | Status: SHIPPED | OUTPATIENT
Start: 2024-08-09 | End: 2024-08-16

## 2024-08-09 NOTE — TELEPHONE ENCOUNTER
Pt urine was sent to [facility] for testing and came back positive for Enterococcus faecalis, Ecoli, Viridans group strep and coagulase neg staff group. Lisandra was sent the test results and prescribed Macrobid. Results are being scanned into pt chart and pt informed of infection and to  rx.

## 2024-09-03 DIAGNOSIS — R39.89 BLADDER PAIN: ICD-10-CM

## 2024-09-03 RX ORDER — AMITRIPTYLINE HYDROCHLORIDE 25 MG/1
25 TABLET, FILM COATED ORAL NIGHTLY
Qty: 30 TABLET | Refills: 3 | Status: SHIPPED | OUTPATIENT
Start: 2024-09-03 | End: 2025-09-03

## 2024-09-03 NOTE — TELEPHONE ENCOUNTER
Pt left message stating her PCP accidentally discontinued her Amitriptyline and she needs it sent back in to [facility] pharmacy, thanks.

## 2024-09-04 ENCOUNTER — TELEPHONE (OUTPATIENT)
Dept: UROLOGY | Facility: CLINIC | Age: 74
End: 2024-09-04
Payer: COMMERCIAL

## 2024-09-04 DIAGNOSIS — N30.00 ACUTE CYSTITIS WITHOUT HEMATURIA: Primary | ICD-10-CM

## 2024-09-04 RX ORDER — NITROFURANTOIN 25; 75 MG/1; MG/1
100 CAPSULE ORAL 2 TIMES DAILY
Qty: 14 CAPSULE | Refills: 0 | Status: SHIPPED | OUTPATIENT
Start: 2024-09-04 | End: 2024-09-11

## 2024-09-04 NOTE — TELEPHONE ENCOUNTER
Pt is scheduled to see Dr. Gold and states she will discuss with her regarding the instillations. She will  the Macrobid and complete another 7 days, thanks.

## 2024-09-04 NOTE — TELEPHONE ENCOUNTER
Pt left message stating she finished her 7 day course of Macrobid and she felt a little better at first but now the Sx are right back including the dysuria and she is wondering if she needs more abx or another test. We did a [facility] test this last time that came back positive. Please advise, thanks.

## 2024-09-18 NOTE — PROGRESS NOTES
FOLLOW-UP VISIT       HISTORY OF PRESENT ILLNESS:   Pavan Cuevas is a 74 y.o. female who presents to me today for follow-up of IAC and OAB. She reports insomnia nightly and is unsure if this is due to stress and anxiety or due to nocturia. Her daughter has Stage 4 Metastatic Breast Cancer which is causing her extreme distress. She denies dysuria. She has discontinued using vesicare due experiencing dry mouth and constipation. She saw Dr. Zurita on 7/19/24 for chronic constipation. She was started on 2-3 Colace BID, Miralax and Benefiber daily to keep her stools more soft and regular which have helped her constipation symptoms.     PAST MEDICAL HISTORY:  No past medical history on file.    PAST SURGICAL HISTORY:  No past surgical history on file.     ALLERGIES:   Allergies   Allergen Reactions    Nsaids (Non-Steroidal Anti-Inflammatory Drug) Unknown     Heart Condition    Specified to avoid use of NSAIDs beyond 1 week by Cardiology    Trospium Rash        MEDICATIONS:   Medication Documentation Review Audit       Reviewed by Steph Toussaint MA (Medical Assistant) on 06/14/24 at 0931      Medication Order Taking? Sig Documenting Provider Last Dose Status   albuterol (ProAir HFA) 90 mcg/actuation inhaler [id number] Yes  Historical MD Chris Taking Active   amitriptyline (Elavil) 25 mg tablet [id number] No 1 tablet (25 mg) once daily at bedtime. Marko Perez MD Not Taking Active   diazePAM (Valium) 2 mg tablet [id number] No Take 1 tablet (2 mg) by mouth. Marko Perez MD Not Taking Active   docusate sodium (COLACE ORAL) [id number] Yes Take 1 capsule by mouth 2 times a day. Marko Perez MD Taking Active   estradiol (Estrace) 0.01 % (0.1 mg/gram) vaginal cream [id number] Yes Insert a pea-sized amount into vagina three times per week at bedtime Eufemia Gold MD MPH Taking Active   flavoxATE (Urispas) 100 mg tablet [id number] No Take 1 tablet (100 mg) by mouth. Marko Perez MD Not Taking  Active   fluticasone (Flonase) 50 mcg/actuation nasal spray [id number] No Administer into affected nostril(s). Historical Provider, MD Not Taking Active   hydrOXYzine HCL (Atarax) 25 mg tablet [id number] No Take 1 tablet (25 mg) by mouth. Historical Provider, MD Not Taking Active   lisinopril 2.5 mg tablet [id number] Yes Take 1 tablet (2.5 mg) by mouth once daily. Historical Provider, MD Taking Active   omeprazole (PriLOSEC) 40 mg DR capsule [id number] Yes Take by mouth. Historical Provider, MD Taking Active   polyethylene glycol (ClearLax) 17 gram packet [id number] Yes Mix 17 grams in 8 ounces of liquid and drink twice daily as needed Historical Provider, MD Taking Active   polyethylene glycol-electrolytes (Nulytely) 420 gram solution [id number] Yes  Historical Provider, MD Taking Active   SITagliptin phosphate (Januvia) 100 mg tablet [id number] Yes Take 1 tablet (100 mg) by mouth once daily. Historical Provider, MD Taking Active   sulfamethoxazole-trimethoprim (Bactrim DS) 800-160 mg tablet [id number] No Take 1 tablet by mouth every 12 hours   Patient not taking: Reported on 6/14/2024    ALICE Walsh Not Taking Active   topiramate (Topamax) 50 mg tablet [id number] No Take 4 mg by mouth as needed at bedtime. Historical Provider, MD Not Taking Active   trospium (Sanctura) 20 mg tablet [id number] No Take 1 tablet (20 mg) by mouth 2 times a day.   Patient not taking: Reported on 6/14/2024    ALICE Walsh Not Taking Active                     SOCIAL HISTORY:  Patient  reports that she has never smoked. She has been exposed to tobacco smoke. She has never used smokeless tobacco.   Social History     Socioeconomic History    Marital status: Single     Spouse name: Not on file    Number of children: Not on file    Years of education: Not on file    Highest education level: Not on file   Occupational History    Not on file   Tobacco Use    Smoking status: Never     Passive exposure: Past    Smokeless tobacco:  Never   Substance and Sexual Activity    Alcohol use: Not on file    Drug use: Not on file    Sexual activity: Not on file   Other Topics Concern    Not on file   Social History Narrative    Not on file     Social Determinants of Health     Financial Resource Strain: Not on file   Food Insecurity: Not on file   Transportation Needs: Not on file   Physical Activity: Not on file   Stress: Not on file   Social Connections: Not on file   Intimate Partner Violence: Not on file   Housing Stability: Not on file       FAMILY HISTORY:  No family history on file.    REVIEW OF SYSTEMS:  Constitutional: Negative for fever and chills. Denies anorexia, weight loss.  Eyes: Negative for visual disturbance.   Respiratory: Negative for shortness of breath.    Cardiovascular: Negative for chest pain.   Gastrointestinal: Negative for nausea and vomiting.   Genitourinary: See interval history above.  Skin: Negative for rash.   Neurological: Negative for dizziness and numbness.   Psychiatric/Behavioral: Negative for confusion and decreased concentration.     PHYSICAL EXAM:  There were no vitals taken for this visit.  Virtual appointment, patient appears well.        Assessment:      1. IC (interstitial cystitis)        2. Insomnia, unspecified type  Referral to Psychology          Pavan Cuevas is a 74 y.o. female with IC and insomnia.      Plan:   Referral to Dr. Luis for CBT insomnia.   Sent patient a bladder matters pamphlet.   Follow-up in 3 months.       Eufemia Gold MD [facility]    Scribe Attestation  By signing my name below, IGinger, Scribe   attest that this documentation has been prepared under the direction and in the presence of Eufemia Gold MD MPH.

## 2024-09-19 ENCOUNTER — APPOINTMENT (OUTPATIENT)
Dept: UROLOGY | Facility: CLINIC | Age: 74
End: 2024-09-19
Payer: COMMERCIAL

## 2024-09-19 DIAGNOSIS — G47.00 INSOMNIA, UNSPECIFIED TYPE: ICD-10-CM

## 2024-09-19 DIAGNOSIS — N30.10 IC (INTERSTITIAL CYSTITIS): ICD-10-CM

## 2024-09-19 PROCEDURE — 99213 OFFICE O/P EST LOW 20 MIN: CPT | Performed by: OBSTETRICS & GYNECOLOGY

## 2024-09-26 ENCOUNTER — APPOINTMENT (OUTPATIENT)
Dept: UROLOGY | Facility: CLINIC | Age: 74
End: 2024-09-26
Payer: COMMERCIAL

## 2024-11-01 ENCOUNTER — APPOINTMENT (OUTPATIENT)
Dept: BEHAVIORAL HEALTH | Facility: CLINIC | Age: 74
End: 2024-11-01
Payer: COMMERCIAL

## 2024-11-01 DIAGNOSIS — N30.10 INTERSTITIAL CYSTITIS: ICD-10-CM

## 2024-11-01 DIAGNOSIS — G47.01 SLEEP DISORDER DUE TO A GENERAL MEDICAL CONDITION, INSOMNIA TYPE: ICD-10-CM

## 2024-11-01 DIAGNOSIS — G47.00 INSOMNIA, UNSPECIFIED TYPE: ICD-10-CM

## 2024-11-01 PROCEDURE — 90791 PSYCH DIAGNOSTIC EVALUATION: CPT | Performed by: PSYCHOLOGIST

## 2024-11-01 ASSESSMENT — SLEEP AND FATIGUE QUESTIONNAIRES
SATISFACTION_WITH_CURRENT_SLEEP_PATTERN: VERY DISSATISFIED
SLEEP_PROBLEM_INTERFERES_DAILY_ACTIVITIES: VERY MUCH NOTICEABLE
SLEEP_PROBLEM_NOTICEABLE_TO_OTHERS: NOT AT ALL NOTICEABLE
WORRIED_DISTRESSED_DUE_TO_SLEEP: VERY MUCH NOTICEABLE
DIFFICULTY_FALLING_ASLEEP: SEVERE
DIFFICULTY_STAYING_ASLEEP: SEVERE

## 2024-11-02 PROBLEM — G47.01 SLEEP DISORDER DUE TO A GENERAL MEDICAL CONDITION, INSOMNIA TYPE: Status: ACTIVE | Noted: 2024-11-02

## 2024-11-15 ENCOUNTER — HOSPITAL ENCOUNTER (OUTPATIENT)
Dept: RADIOLOGY | Facility: CLINIC | Age: 74
Discharge: HOME | End: 2024-11-15
Payer: COMMERCIAL

## 2024-11-15 ENCOUNTER — OFFICE VISIT (OUTPATIENT)
Dept: GASTROENTEROLOGY | Facility: CLINIC | Age: 74
End: 2024-11-15
Payer: COMMERCIAL

## 2024-11-15 VITALS
SYSTOLIC BLOOD PRESSURE: 133 MMHG | DIASTOLIC BLOOD PRESSURE: 75 MMHG | TEMPERATURE: 97 F | WEIGHT: 159 LBS | HEART RATE: 77 BPM | BODY MASS INDEX: 25.55 KG/M2 | HEIGHT: 66 IN

## 2024-11-15 DIAGNOSIS — K59.00 CONSTIPATION, UNSPECIFIED CONSTIPATION TYPE: ICD-10-CM

## 2024-11-15 DIAGNOSIS — R13.12 OROPHARYNGEAL DYSPHAGIA: Primary | ICD-10-CM

## 2024-11-15 DIAGNOSIS — R11.2 NAUSEA AND VOMITING, UNSPECIFIED VOMITING TYPE: ICD-10-CM

## 2024-11-15 PROCEDURE — 99204 OFFICE O/P NEW MOD 45 MIN: CPT | Performed by: NURSE PRACTITIONER

## 2024-11-15 PROCEDURE — 1159F MED LIST DOCD IN RCRD: CPT | Performed by: NURSE PRACTITIONER

## 2024-11-15 PROCEDURE — 1036F TOBACCO NON-USER: CPT | Performed by: NURSE PRACTITIONER

## 2024-11-15 PROCEDURE — 3008F BODY MASS INDEX DOCD: CPT | Performed by: NURSE PRACTITIONER

## 2024-11-15 PROCEDURE — 1125F AMNT PAIN NOTED PAIN PRSNT: CPT | Performed by: NURSE PRACTITIONER

## 2024-11-15 PROCEDURE — 1160F RVW MEDS BY RX/DR IN RCRD: CPT | Performed by: NURSE PRACTITIONER

## 2024-11-15 PROCEDURE — 3075F SYST BP GE 130 - 139MM HG: CPT | Performed by: NURSE PRACTITIONER

## 2024-11-15 PROCEDURE — 99214 OFFICE O/P EST MOD 30 MIN: CPT | Performed by: NURSE PRACTITIONER

## 2024-11-15 PROCEDURE — 74018 RADEX ABDOMEN 1 VIEW: CPT

## 2024-11-15 PROCEDURE — 3078F DIAST BP <80 MM HG: CPT | Performed by: NURSE PRACTITIONER

## 2024-11-15 ASSESSMENT — ENCOUNTER SYMPTOMS
RESPIRATORY NEGATIVE: 1
NEUROLOGICAL NEGATIVE: 1
PSYCHIATRIC NEGATIVE: 1
CONSTITUTIONAL NEGATIVE: 1
CONSTIPATION: 1
ALLERGIC/IMMUNOLOGIC NEGATIVE: 1
CARDIOVASCULAR NEGATIVE: 1
MUSCULOSKELETAL NEGATIVE: 1
HEMATOLOGIC/LYMPHATIC NEGATIVE: 1
ENDOCRINE NEGATIVE: 1
EYES NEGATIVE: 1

## 2024-11-15 ASSESSMENT — PAIN SCALES - GENERAL: PAINLEVEL_OUTOF10: 4

## 2024-11-15 NOTE — PROGRESS NOTES
Subjective   Patient ID: Pavan Cuevas is a 74 y.o. female who presents for New Patient Visit (New Patient ).  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  Colonoscopy in 2022 was normal  Previously seen by Larisa Zurita CNP and colorectal who recommended Colace, MiraLAX and Benefiber  Labs reviewed 12/16/2022 H&H 13.1 and 39.4  8/16/2024 hemoglobin A1c 6.0%  Referred by PCP   Food feels like it gets stuck going down  Boiled egg gets stuck   Eats pretty healthy  No spicy  Feels full of gas and air  The only way to get rid of it is to stick her finger down her throat and brings up undigested foods from days previously eaten  BM; constipation to diarrhea with the softners and laxative  With colace daily BM daily  Sometimes complete and then not   Vegetables  Water- all day      Review of Systems   Constitutional: Negative.    HENT: Negative.     Eyes: Negative.    Respiratory: Negative.     Cardiovascular: Negative.    Gastrointestinal:  Positive for constipation.   Endocrine: Negative.    Genitourinary: Negative.    Musculoskeletal: Negative.    Skin: Negative.    Allergic/Immunologic: Negative.    Neurological: Negative.    Hematological: Negative.    Psychiatric/Behavioral: Negative.         Objective   Physical Exam  Constitutional:       Appearance: Normal appearance.   HENT:      Head: Normocephalic.      Nose: Nose normal.      Mouth/Throat:      Mouth: Mucous membranes are moist.   Eyes:      Pupils: Pupils are equal, round, and reactive to light.   Cardiovascular:      Rate and Rhythm: Normal rate and regular rhythm.      Pulses: Normal pulses.      Heart sounds: Normal heart sounds.   Pulmonary:      Effort: Pulmonary effort is normal.      Breath sounds: Normal breath sounds.   Abdominal:      General: Bowel sounds are normal.      Palpations: Abdomen is soft.    Musculoskeletal:         General: Normal range of motion.      Cervical back: Normal range of motion and neck supple.   Skin:     General: Skin is warm and dry.   Neurological:      General: No focal deficit present.      Mental Status: She is alert.   Psychiatric:         Mood and Affect: Mood normal.         Assessment/Plan        Dysphagia and ? Gastroparesis- I would recommend getting an EGD ( upper Gi scope) to assess the upper GI tract.    Abdominal discomfort- the increase in symptoms and urinary frequency may be related to retained stool that can be pushing on your bladder as well. I would recommend getting an abd x-ray to assess for stool burden.    I will contact you with the results and determine follow up    ALICE Sapp 11/15/24 2:22 PM

## 2024-11-15 NOTE — PATIENT INSTRUCTIONS
Dysphagia and ? Gastroparesis- I would recommend getting an EGD ( upper Gi scope) to assess the upper GI tract.    Abdominal discomfort- the increase in symptoms and urinary frequency may be related to retained stool that can be pushing on your bladder as well. I would recommend getting an abd x-ray to assess for stool burden.    I will contact you with the results and determine follow up

## 2024-11-21 ENCOUNTER — HOSPITAL ENCOUNTER (OUTPATIENT)
Dept: GASTROENTEROLOGY | Facility: HOSPITAL | Age: 74
Discharge: HOME | End: 2024-11-21
Payer: COMMERCIAL

## 2024-11-21 VITALS
WEIGHT: 170.86 LBS | BODY MASS INDEX: 25.89 KG/M2 | DIASTOLIC BLOOD PRESSURE: 76 MMHG | OXYGEN SATURATION: 99 % | SYSTOLIC BLOOD PRESSURE: 128 MMHG | TEMPERATURE: 97.2 F | HEIGHT: 68 IN | HEART RATE: 72 BPM | RESPIRATION RATE: 18 BRPM

## 2024-11-21 DIAGNOSIS — R13.12 OROPHARYNGEAL DYSPHAGIA: Primary | ICD-10-CM

## 2024-11-21 DIAGNOSIS — R11.2 NAUSEA AND VOMITING, UNSPECIFIED VOMITING TYPE: ICD-10-CM

## 2024-11-21 PROCEDURE — 7100000009 HC PHASE TWO TIME - INITIAL BASE CHARGE

## 2024-11-21 PROCEDURE — 2500000004 HC RX 250 GENERAL PHARMACY W/ HCPCS (ALT 636 FOR OP/ED): Performed by: INTERNAL MEDICINE

## 2024-11-21 PROCEDURE — 99152 MOD SED SAME PHYS/QHP 5/>YRS: CPT | Performed by: INTERNAL MEDICINE

## 2024-11-21 PROCEDURE — 3700000012 HC SEDATION LEVEL 5+ TIME - INITIAL 15 MINUTES 5/> YEARS

## 2024-11-21 PROCEDURE — 43235 EGD DIAGNOSTIC BRUSH WASH: CPT | Performed by: INTERNAL MEDICINE

## 2024-11-21 PROCEDURE — 7100000010 HC PHASE TWO TIME - EACH INCREMENTAL 1 MINUTE

## 2024-11-21 RX ORDER — MEPERIDINE HYDROCHLORIDE 25 MG/ML
INJECTION INTRAMUSCULAR; INTRAVENOUS; SUBCUTANEOUS AS NEEDED
Status: COMPLETED | OUTPATIENT
Start: 2024-11-21 | End: 2024-11-21

## 2024-11-21 RX ORDER — MIDAZOLAM HYDROCHLORIDE 1 MG/ML
INJECTION, SOLUTION INTRAMUSCULAR; INTRAVENOUS AS NEEDED
Status: COMPLETED | OUTPATIENT
Start: 2024-11-21 | End: 2024-11-21

## 2024-11-21 ASSESSMENT — PAIN SCALES - GENERAL
PAINLEVEL_OUTOF10: 0 - NO PAIN

## 2024-11-21 ASSESSMENT — COLUMBIA-SUICIDE SEVERITY RATING SCALE - C-SSRS
2. HAVE YOU ACTUALLY HAD ANY THOUGHTS OF KILLING YOURSELF?: NO
6. HAVE YOU EVER DONE ANYTHING, STARTED TO DO ANYTHING, OR PREPARED TO DO ANYTHING TO END YOUR LIFE?: NO
1. IN THE PAST MONTH, HAVE YOU WISHED YOU WERE DEAD OR WISHED YOU COULD GO TO SLEEP AND NOT WAKE UP?: NO

## 2024-11-21 ASSESSMENT — PAIN - FUNCTIONAL ASSESSMENT
PAIN_FUNCTIONAL_ASSESSMENT: 0-10

## 2024-11-21 NOTE — DISCHARGE INSTRUCTIONS

## 2024-11-21 NOTE — H&P
History Of Present Illness  Pavan Cuevas is a 74 y.o. female presenting with dysphagia.     Past Medical History  Past Medical History:   Diagnosis Date    HTN (hypertension)     Mitral valve prolapse      Surgical History  History reviewed. No pertinent surgical history.  Social History  She reports that she has never smoked. She has been exposed to tobacco smoke. She has never used smokeless tobacco. She reports that she does not drink alcohol and does not use drugs.    Family History  No family history on file.     Allergies  Allergies   Allergen Reactions    Nsaids (Non-Steroidal Anti-Inflammatory Drug) Unknown     Heart Condition    Specified to avoid use of NSAIDs beyond 1 week by Cardiology    Trospium Rash     Review of Systems  Pre-sedation Evaluation:  ASA Classification - ASA 2 - Patient with mild systemic disease with no functional limitations  Mallampati Score - II (hard and soft palate, upper portion of tonsils and uvula visible)    Physical Exam  Vitals and nursing note reviewed.   Constitutional:       Appearance: Normal appearance.   Cardiovascular:      Rate and Rhythm: Normal rate and regular rhythm.   Pulmonary:      Effort: Pulmonary effort is normal.      Breath sounds: Normal breath sounds.   Neurological:      Mental Status: She is alert.          Last Recorded Vitals  There were no vitals taken for this visit.     Assessment/Plan   R/O esophagitis for EGD     PTA/Current Medications:  (Not in a hospital admission)    Current Outpatient Medications   Medication Sig Dispense Refill    acetaminophen (Tylenol) 500 mg tablet 2 tablets (1,000 mg) every other day.      albuterol (ProAir HFA) 90 mcg/actuation inhaler       docusate sodium (COLACE ORAL) Take 1 capsule by mouth 2 times a day.      estradiol (Estrace) 0.01 % (0.1 mg/gram) vaginal cream Insert a pea-sized amount into vagina three times per week at bedtime 42.5 g 3    lisinopril 2.5 mg tablet Take 1 tablet (2.5 mg) by mouth once daily.       SITagliptin phosphate (Januvia) 100 mg tablet Take 1 tablet (100 mg) by mouth once daily.      topiramate (Topamax) 50 mg tablet Take 4 mg by mouth as needed at bedtime.       Current Facility-Administered Medications   Medication Dose Route Frequency Provider Last Rate Last Admin    ampicillin-sulbactam (Unasyn) 3 g in sodium chloride 0.9%  mL  3 g intravenous Once MD Eddi Teixeira MD

## 2024-11-22 ASSESSMENT — PAIN SCALES - GENERAL: PAINLEVEL_OUTOF10: 0 - NO PAIN

## 2024-12-24 ENCOUNTER — HOSPITAL ENCOUNTER (OUTPATIENT)
Dept: RADIOLOGY | Facility: HOSPITAL | Age: 74
Discharge: HOME | End: 2024-12-24
Payer: COMMERCIAL

## 2024-12-24 DIAGNOSIS — R11.2 NAUSEA AND VOMITING, UNSPECIFIED VOMITING TYPE: ICD-10-CM

## 2024-12-24 PROCEDURE — 3430000001 HC RX 343 DIAGNOSTIC RADIOPHARMACEUTICALS: Performed by: NURSE PRACTITIONER

## 2024-12-24 PROCEDURE — A9541 TC99M SULFUR COLLOID: HCPCS | Performed by: NURSE PRACTITIONER

## 2024-12-24 PROCEDURE — 78264 GASTRIC EMPTYING IMG STUDY: CPT

## 2024-12-24 PROCEDURE — 78264 GASTRIC EMPTYING IMG STUDY: CPT | Performed by: RADIOLOGY

## 2025-01-02 ENCOUNTER — APPOINTMENT (OUTPATIENT)
Dept: BEHAVIORAL HEALTH | Facility: CLINIC | Age: 75
End: 2025-01-02
Payer: COMMERCIAL

## 2025-01-02 DIAGNOSIS — G47.01 SLEEP DISORDER DUE TO A GENERAL MEDICAL CONDITION, INSOMNIA TYPE: ICD-10-CM

## 2025-01-02 DIAGNOSIS — E66.3 OVERWEIGHT WITH BODY MASS INDEX (BMI) 25.0-29.9: ICD-10-CM

## 2025-01-02 DIAGNOSIS — F41.9 ANXIETY: ICD-10-CM

## 2025-01-02 PROCEDURE — 90837 PSYTX W PT 60 MINUTES: CPT | Performed by: PSYCHOLOGIST

## 2025-01-07 PROBLEM — F41.9 ANXIETY: Status: ACTIVE | Noted: 2025-01-07

## 2025-01-07 NOTE — PROGRESS NOTES
Time Started: 330  Time Ended: 425  Total time: 55 minutes  Visit type: in person      We discussed that the note will be visible and others healthcare practitioners will have access. The patient has consented to an unrestricted note.      Reason(s) for visit: follow up  Patient continues to have problems with sleep. She returned her sleep diary and we will discuss during the next follow up.   Patient is stressed about her health and about her skin condition. Suggested she see dermatology and referred her to Val Cevallos MD  Next steps review sleep diary and revise/review treatment plan.    MSE: mood reported as anxious. No si or plan. Affect was congruent with her reported mood.     Follow up: next week

## 2025-02-11 ENCOUNTER — APPOINTMENT (OUTPATIENT)
Dept: BEHAVIORAL HEALTH | Facility: CLINIC | Age: 75
End: 2025-02-11
Payer: COMMERCIAL

## 2025-02-11 DIAGNOSIS — G47.01 SLEEP DISORDER DUE TO A GENERAL MEDICAL CONDITION, INSOMNIA TYPE: ICD-10-CM

## 2025-02-11 DIAGNOSIS — F41.9 ANXIETY: ICD-10-CM

## 2025-02-11 DIAGNOSIS — N30.10 INTERSTITIAL CYSTITIS: ICD-10-CM

## 2025-02-11 PROCEDURE — 90834 PSYTX W PT 45 MINUTES: CPT | Performed by: PSYCHOLOGIST

## 2025-02-11 NOTE — PROGRESS NOTES
Time Started: 203  Time Ended: 253  Total time: 50 minutes  Visit type: telephone/audio only    We discussed that the note will be visible and others healthcare practitioners will have access. The patient has consented to an unrestricted note.    Virtual or Telephone Consent    A telephone visit (audio only) between the patient (at the originating site) and the provider (at the distant site) was utilized to provide this telehealth service.   Verbal consent was requested and obtained from Pavan Cuevas on this date, 02/11/25 for a telehealth visit.        Reason(s) for visit: follow up    She is awakening 2-3 times per night and no longer has the pain  This is much progress for Pavan.   It is difficult to do the sleep compression. The latest she can stay up is 1 am and up again in about 2-3 hours. Then she is up with her mind racing about anything.   She cannot take vitamin C due to her interstitial cystitis. However, Pavan decided to take this supplement and informed me of this during the call. So far, she stated she is not having problems with it.     Interventions: review of problems with sleep. Positive reinforcement for progress.   Next steps: collaborate with Dr. Gold about calming supplements, such as Calcium glycerophosphate  Magnesium glycinate. Patient will discuss with Dr. Gold    MSE: mood was reported as anxious. Her affect was observed as congruent with her reported mood.     Follow up:  March 14 @2:30 via telephone.

## 2025-03-14 ENCOUNTER — TELEMEDICINE (OUTPATIENT)
Facility: HOSPITAL | Age: 75
End: 2025-03-14
Payer: COMMERCIAL

## 2025-03-14 DIAGNOSIS — G47.01 SLEEP DISORDER DUE TO A GENERAL MEDICAL CONDITION, INSOMNIA TYPE: ICD-10-CM

## 2025-03-14 DIAGNOSIS — F41.9 ANXIETY: ICD-10-CM
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
## 2025-05-01 ENCOUNTER — TELEMEDICINE (OUTPATIENT)
Dept: BEHAVIORAL HEALTH | Facility: CLINIC | Age: 75
End: 2025-05-01
Payer: COMMERCIAL

## 2025-05-01 ENCOUNTER — APPOINTMENT (OUTPATIENT)
Dept: BEHAVIORAL HEALTH | Facility: CLINIC | Age: 75
End: 2025-05-01
Payer: COMMERCIAL

## 2025-05-01 DIAGNOSIS — F43.0 ACUTE REACTION TO SITUATIONAL STRESS: ICD-10-CM

## 2025-05-01 DIAGNOSIS — G47.01 SLEEP DISORDER DUE TO A GENERAL MEDICAL CONDITION, INSOMNIA TYPE: ICD-10-CM

## 2025-05-01 DIAGNOSIS — F41.9 ANXIETY: ICD-10-CM

## 2025-05-01 DIAGNOSIS — E66.3 OVERWEIGHT WITH BODY MASS INDEX (BMI) 25.0-29.9: ICD-10-CM

## 2025-05-01 PROCEDURE — 90837 PSYTX W PT 60 MINUTES: CPT | Performed by: PSYCHOLOGIST

## 2025-05-01 NOTE — PROGRESS NOTES
"      We discussed that the note will be visible and others healthcare practitioners will have access. The patient has consented to an unrestricted note due to working with a multidisciplinary team.  Virtual or Telephone Consent    An interactive audio and video telecommunication system which permits real time communications between the patient (at the originating site) and provider (at the distant site) was utilized to provide this telehealth service.   Verbal consent was requested and obtained from Pavan Cuevas on this date, 05/04/25 for a telehealth visit and the patient's location was confirmed at the time of the visit.       Time Started: 233  Time Ended: 333  Total time: 60 minutes  Visit type: telephone/audio only      Reason(s) for visit: follow up    Patient has significant stressors related to her family's health and one in-law who recently passed away.     Daughter sick. Pt going to hospital daily to visit her daughter, who has cancer.  Brother, blood clot and in [facility] hospital.  Mother-in-law recently passe away. She is hleping to clean the house.    Can stay asleep for 4-5 hours but it is like \"I am hearing a running commentary and can remember all of this.\" Continues to take magnesium, which helps some.    Intervention: supportive therapy and review of coping skills to manage stress. However, supportive therapy was more important here today.     MSE: mood sad and anxious due to family situations, yet accepting. She is future oriented. No SI or plan reported. Affect was observed as congruent with her reported mood.     Follow up: scheduled on 6/3   "

## 2025-05-04 PROBLEM — F43.0 ACUTE REACTION TO SITUATIONAL STRESS: Status: ACTIVE | Noted: 2025-05-04

## 2025-06-03 ENCOUNTER — APPOINTMENT (OUTPATIENT)
Dept: BEHAVIORAL HEALTH | Facility: CLINIC | Age: 75
End: 2025-06-03
Payer: COMMERCIAL

## 2025-06-03 DIAGNOSIS — F41.9 ANXIETY AND DEPRESSION: ICD-10-CM

## 2025-06-03 DIAGNOSIS — K21.9 GASTROESOPHAGEAL REFLUX DISEASE WITHOUT ESOPHAGITIS: ICD-10-CM

## 2025-06-03 DIAGNOSIS — F32.A ANXIETY AND DEPRESSION: ICD-10-CM

## 2025-06-03 DIAGNOSIS — I10 ESSENTIAL HYPERTENSION: ICD-10-CM

## 2025-06-03 DIAGNOSIS — F43.0 ACUTE REACTION TO SITUATIONAL STRESS: ICD-10-CM

## 2025-06-03 PROCEDURE — 90837 PSYTX W PT 60 MINUTES: CPT | Performed by: PSYCHOLOGIST

## 2025-06-03 NOTE — PROGRESS NOTES
We discussed that the note will be visible and others healthcare practitioners will have access. The patient has consented to an unrestricted note.    Virtual or Telephone Consent    An interactive audio and video telecommunication system which permits real time communications between the patient (at the originating site) and provider (at the distant site) was utilized to provide this telehealth service.   Verbal consent was requested and obtained from Pavan Cuevas on this date, 06/03/25 for a telehealth visit and the patient's location was confirmed at the time of the visit.     Time Started: 100  Time Ended: 159  Total time: 59 minutes   Visit type: telephone only    Reason(s) for visit: follow up  Progress: with her daughter (tumors on her liver)  Challenges: patient has been unhappy for many years. She has lost a lot of loved ones.   One of her daughters has cancer and is trying the last possible treatment.   Interventions: supportive therapy. Hope. Discussed finding happiness and letting go of emotions that are no longer serving her.   Next steps: we have moved away from CBT-I treatment to general OP.     MSE: affect was observed as depressed. She is future oriented. No SI or plan reported.   Mood was sad.     Follow up: we agreed to meet monthly.